# Patient Record
Sex: MALE | Race: WHITE | NOT HISPANIC OR LATINO | URBAN - METROPOLITAN AREA
[De-identification: names, ages, dates, MRNs, and addresses within clinical notes are randomized per-mention and may not be internally consistent; named-entity substitution may affect disease eponyms.]

---

## 2017-05-03 ENCOUNTER — IMPORTED ENCOUNTER (OUTPATIENT)
Dept: URBAN - METROPOLITAN AREA CLINIC 31 | Facility: CLINIC | Age: 82
End: 2017-05-03

## 2017-05-03 PROBLEM — H35.3131: Noted: 2017-05-03

## 2017-05-03 PROBLEM — H04.123: Noted: 2017-05-03

## 2017-05-03 PROBLEM — Z96.1: Noted: 2017-05-03

## 2017-05-03 PROBLEM — H43.811: Noted: 2017-05-03

## 2017-05-03 PROCEDURE — 92015 DETERMINE REFRACTIVE STATE: CPT

## 2017-05-03 PROCEDURE — 92014 COMPRE OPH EXAM EST PT 1/>: CPT

## 2017-05-03 NOTE — PATIENT DISCUSSION
1.  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 2.  Pseudophakia OU - IOLs stable. open capsules ou. Monitor. 3. PVD OD:  Looks like seahorse. Patient was cautioned to call our office immediately if they experience a substantial change in their symptoms such as an increase in floaters persistent flashes loss of visual field (may appear as a shadow or a curtain) or decrease in visual acuity as these may indicate a retinal tear or detachment. If this is a new problem patient will need to return for re-examination  as determined by the [address] [place] [address] 10. ARMD OU dry - stable. Importance of smoking cessation blood pressure control and healthy diet were emphasized. In accordance with the AREDS study a good multivitamin containing EC and Zinc were recommened to be taken daily. Patient was instructed to self monitor their monocular vision (reading/[employer]) at least weekly. Patient should immediately report any new onset of decreased vision or metamorphopsia. 5.  s/p BULB 2/16 Laquis 6. Return for an appointment in 6 months for  DFTA.

## 2018-05-04 ENCOUNTER — IMPORTED ENCOUNTER (OUTPATIENT)
Dept: URBAN - METROPOLITAN AREA CLINIC 31 | Facility: CLINIC | Age: 83
End: 2018-05-04

## 2018-05-04 PROBLEM — H04.123: Noted: 2018-05-04

## 2018-05-04 PROBLEM — Z96.1: Noted: 2018-05-04

## 2018-05-04 PROBLEM — H43.811: Noted: 2018-05-04

## 2018-05-04 PROBLEM — H35.3131: Noted: 2018-05-04

## 2018-05-04 PROCEDURE — 92014 COMPRE OPH EXAM EST PT 1/>: CPT

## 2018-05-04 PROCEDURE — 92250 FUNDUS PHOTOGRAPHY W/I&R: CPT

## 2018-05-04 PROCEDURE — 92015 DETERMINE REFRACTIVE STATE: CPT

## 2018-05-04 NOTE — PATIENT DISCUSSION
1.  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 2.  Pseudophakia OU - IOLs stable. open capsules ou. Monitor. 3. PVD OD:  Looks like seahorse. Patient was cautioned to call our office immediately if they experience a substantial change in their symptoms such as an increase in floaters persistent flashes loss of visual field4. ARMD OU dry - stable. NO smoking vits UV suns and amsler. Pt takes [id number]. Patient should immediately report any new onset of decreased vision or metamorphopsia. 5.  s/p BULB 2/16 Laquis 6. NO rx changes. 7. RTN 6 months for  DFTA/[facility].

## 2018-11-06 ENCOUNTER — IMPORTED ENCOUNTER (OUTPATIENT)
Dept: URBAN - METROPOLITAN AREA CLINIC 31 | Facility: CLINIC | Age: 83
End: 2018-11-06

## 2018-11-06 PROBLEM — Z96.1: Noted: 2018-11-06

## 2018-11-06 PROBLEM — H43.811: Noted: 2018-11-06

## 2018-11-06 PROBLEM — H04.123: Noted: 2018-11-06

## 2018-11-06 PROBLEM — H35.3131: Noted: 2018-11-06

## 2018-11-06 PROCEDURE — 99214 OFFICE O/P EST MOD 30 MIN: CPT

## 2018-11-06 NOTE — PATIENT DISCUSSION
1.  Dry Eye OU:  Continue with Artificial Tears at least 2x per day. 2.  Pseudophakia OU - IOLs stable. open capsules ou. Monitor. 3. PVD OD:  Looks like seahorse. Patient was cautioned to call our office immediately if they experience a substantial change in their symptoms such as an increase in floaters persistent flashes loss of visual field4. ARMD OU dry - stable. NO smoking vits UV suns and amsler. Pt takes [id number]. Patient should immediately report any new onset of decreased vision or metamorphopsia. 5.  s/p BULB 2/16 Laquis 6. Defer dist rx. NO rx change in near rx. 7.   RTN  4/19 CE/[facility]

## 2019-04-26 ENCOUNTER — IMPORTED ENCOUNTER (OUTPATIENT)
Dept: URBAN - METROPOLITAN AREA CLINIC 31 | Facility: CLINIC | Age: 84
End: 2019-04-26

## 2019-04-26 PROBLEM — H35.3131: Noted: 2019-04-26

## 2019-04-26 PROBLEM — Z96.1: Noted: 2019-04-26

## 2019-04-26 PROBLEM — H43.811: Noted: 2019-04-26

## 2019-04-26 PROBLEM — H04.123: Noted: 2019-04-26

## 2019-04-26 PROCEDURE — 92250 FUNDUS PHOTOGRAPHY W/I&R: CPT

## 2019-04-26 PROCEDURE — 92014 COMPRE OPH EXAM EST PT 1/>: CPT

## 2019-04-26 PROCEDURE — 92015 DETERMINE REFRACTIVE STATE: CPT

## 2019-04-26 NOTE — PATIENT DISCUSSION
1.  Dry Eye OU:  Continue Artificial Tears at least 2x per day. 2.  Pseudophakia OU - IOLs stable. open capsules ou. Monitor. 3. PVD OD:  Looks like seahorse. Patient was cautioned to call our office immediately if they experience a substantial change in their symptoms such as an increase in floaters persistent flashes loss of visual field4. ARMD OU dry - stable. NO smoking vits UV suns and amsler. Pt takes [id number]. Patient should immediately report any new onset of decreased vision or metamorphopsia. 5.  s/p BULB 2/16 Laquis 6. Defer dist rx. NO rx change in near rx. 7.   RTN  10/19  DF/OCT MAC

## 2019-10-22 ENCOUNTER — IMPORTED ENCOUNTER (OUTPATIENT)
Dept: URBAN - METROPOLITAN AREA CLINIC 31 | Facility: CLINIC | Age: 84
End: 2019-10-22

## 2019-10-22 PROBLEM — H35.3131: Noted: 2019-10-22

## 2019-10-22 PROBLEM — H04.123: Noted: 2019-10-22

## 2019-10-22 PROBLEM — Z96.1: Noted: 2019-10-22

## 2019-10-22 PROBLEM — H43.811: Noted: 2019-10-22

## 2019-10-22 PROCEDURE — 92134 CPTRZ OPH DX IMG PST SGM RTA: CPT

## 2019-10-22 PROCEDURE — 99080 SPECIAL REPORTS OR FORMS: CPT

## 2019-10-22 PROCEDURE — 99214 OFFICE O/P EST MOD 30 MIN: CPT

## 2019-10-22 NOTE — PATIENT DISCUSSION
1.  Dry Eye OU:    small amount of crusting--wash lids am and pm--Continue Artificial Tears at least 2x per day. 2.  Pseudophakia OU - IOLs stable. open capsules ou. Monitor. 3. PVD OD:  Looks like seahorse. Patient was cautioned to call our office immediately if they experience a substantial change in their symptoms such as an increase in floaters persistent flashes loss of visual field4. ARMD OU dry - stable. NO smoking vits UV suns and amsler. Pt takes [id number]. Patient should immediately report any new onset of decreased vision or metamorphopsia. OCT mac 256/292. 5.  s/p BULB 2/16 Laquis 6. Defer dist rx. NO rx change in near rx. 7.   RTN  4/20 CE/OPtos- for AMD progression

## 2020-05-05 ENCOUNTER — IMPORTED ENCOUNTER (OUTPATIENT)
Dept: URBAN - METROPOLITAN AREA CLINIC 31 | Facility: CLINIC | Age: 85
End: 2020-05-05

## 2020-05-05 PROBLEM — H01.005: Noted: 2020-05-05

## 2020-05-05 PROBLEM — H35.3131: Noted: 2020-05-05

## 2020-05-05 PROBLEM — H01.001: Noted: 2020-05-05

## 2020-05-05 PROBLEM — H43.811: Noted: 2020-05-05

## 2020-05-05 PROBLEM — H04.123: Noted: 2020-05-05

## 2020-05-05 PROBLEM — H01.004: Noted: 2020-05-05

## 2020-05-05 PROBLEM — H01.002: Noted: 2020-05-05

## 2020-05-05 PROBLEM — Z96.1: Noted: 2020-05-05

## 2020-05-05 PROCEDURE — 92014 COMPRE OPH EXAM EST PT 1/>: CPT

## 2020-05-05 PROCEDURE — 92015 DETERMINE REFRACTIVE STATE: CPT

## 2020-05-05 NOTE — PATIENT DISCUSSION
1.  Dry Eye OU:    small amount of crusting--wash lids am and pm--Continue Artificial Tears at least 2x per day. 2.  Pseudophakia OU - IOLs stable. open capsules ou. Monitor. 3. PVD OD:  Looks like seahorse. Patient was cautioned to call our office immediately if they experience a substantial change in their symptoms such as an increase in floaters persistent flashes loss of visual field4. ARMD OU dry - stable. NO smoking vits UV suns and amsler. Pt takes [id number]. Patient should immediately report any new onset of decreased vision or metamorphopsia. OCT mac 256/292. 5.  s/p BULB 2/16 Laquis 6. Defer dist rx. NO rx change in near rx. 7.   RTN  4/20 CE/OPtos- for AMD progression

## 2020-11-10 ENCOUNTER — IMPORTED ENCOUNTER (OUTPATIENT)
Dept: URBAN - METROPOLITAN AREA CLINIC 31 | Facility: CLINIC | Age: 85
End: 2020-11-10

## 2020-11-10 PROBLEM — Z96.1: Noted: 2020-11-10

## 2020-11-10 PROBLEM — H35.3131: Noted: 2020-11-10

## 2020-11-10 PROBLEM — H43.811: Noted: 2020-11-10

## 2020-11-10 PROBLEM — H04.123: Noted: 2020-11-10

## 2020-11-10 PROCEDURE — 99214 OFFICE O/P EST MOD 30 MIN: CPT

## 2020-11-10 PROCEDURE — 92134 CPTRZ OPH DX IMG PST SGM RTA: CPT

## 2020-11-10 NOTE — PATIENT DISCUSSION
1.  Dry Eye OU:    small amount of crusting--wash lids am and pm--Continue Artificial Tears at least 2x per day. 2.  Pseudophakia OU - IOLs stable. open capsules ou. Monitor. 3. PVD OD:  Looks like seahorse. Patient was cautioned to call our office immediately if they experience a substantial change in their symptoms such as an increase in floaters persistent flashes loss of visual field4. ARMD OU dry - stable. OS? OD--NO smoking vits UV suns and amsler. Pt takes [id number]. Patient should immediately report any new onset of decreased vision or metamorphopsia. OCT mac 11/10/20  257/297. 5.  s/p BULB 2/16 Laquis 6. Defer dist rx. NO rx change in near rx. 7.   RTN  4/21 CE/OPtos/OCT mac- for AMD progression OS>OD

## 2021-02-11 ENCOUNTER — IMPORTED ENCOUNTER (OUTPATIENT)
Dept: URBAN - METROPOLITAN AREA CLINIC 31 | Facility: CLINIC | Age: 86
End: 2021-02-11

## 2021-02-11 PROBLEM — Z96.1: Noted: 2021-02-11

## 2021-02-11 PROBLEM — H35.3131: Noted: 2021-02-11

## 2021-02-11 PROBLEM — H04.123: Noted: 2021-02-11

## 2021-02-11 PROBLEM — H43.811: Noted: 2021-02-11

## 2021-02-11 PROBLEM — H16.143: Noted: 2021-02-11

## 2021-02-11 PROCEDURE — 99213 OFFICE O/P EST LOW 20 MIN: CPT

## 2021-02-11 NOTE — PATIENT DISCUSSION
1.  Dry Eye OU:    small amount of crusting--wash lids am and pm--Continue Artificial Tears at least 2x per day. 2.  Pseudophakia OU - IOLs stable. open capsules ou. Monitor. 3. PVD OD:  Looks like seahorse. Patient was cautioned to call our office immediately if they experience a substantial change in their symptoms such as an increase in floaters persistent flashes loss of visual field4. ARMD OU dry - stable. OS? OD--NO smoking vits UV suns and amsler. Pt takes [id number]. Patient should immediately report any new onset of decreased vision or metamorphopsia. OCT mac 11/10/20  257/297. 5.  s/p BULB 2/16 Laquis 6. Defer dist rx. NO rx change in near rx. 7.   RTN  4/21 CE/OPtos/OCT mac- for AMD progression OS>OD

## 2021-02-11 NOTE — PATIENT DISCUSSION
1.  SPK OS>OD-- Start genteal tid and retaine rosio qhs ou--Pt has hard time with drops so rosio may be easier--Pt will call in 2 weeks if better. 2.  Dry Eye OU:    small amount of crusting--wash lids am and pm--Continue Artificial Tears at least 2x per day. 2.  Pseudophakia OU - IOLs stable. open capsules ou. Monitor. 3. PVD OD:  Looks like seahorse. Patient was cautioned to call our office immediately if they experience a substantial change in their symptoms such as an increase in floaters persistent flashes loss of visual field4. ARMD OU dry - stable. OS? OD--NO smoking vits UV suns and amsler. Pt takes [id number]. Patient should immediately report any new onset of decreased vision or metamorphopsia. OCT mac 11/10/20  257/297. 5.  s/p BULB 2/16 Laquis 6. Defer dist rx. NO rx change in near rx. 7.   RTN  4/21 CE/OPtos/OCT mac- for AMD progression OS>OD

## 2021-10-06 ENCOUNTER — IMPORTED ENCOUNTER (OUTPATIENT)
Dept: URBAN - METROPOLITAN AREA CLINIC 31 | Facility: CLINIC | Age: 86
End: 2021-10-06

## 2021-10-06 PROBLEM — H43.811: Noted: 2021-10-06

## 2021-10-06 PROBLEM — H04.123: Noted: 2021-10-06

## 2021-10-06 PROBLEM — H35.3131: Noted: 2021-10-06

## 2021-10-06 PROBLEM — Z96.1: Noted: 2021-10-06

## 2021-10-06 PROBLEM — H16.143: Noted: 2021-10-06

## 2021-10-06 PROCEDURE — 92014 COMPRE OPH EXAM EST PT 1/>: CPT

## 2021-10-06 PROCEDURE — 92134 CPTRZ OPH DX IMG PST SGM RTA: CPT

## 2021-10-06 PROCEDURE — 92015 DETERMINE REFRACTIVE STATE: CPT

## 2021-10-06 NOTE — PATIENT DISCUSSION
1.  SPK OS>OD-- Cont genteal tid and retaine rosio qhs ou--Pt has hard time with drops so rosio may be easier-- 2. Dry Eye OU:    small amount of crusting--wash lids am and pm--Continue Artificial Tears at least 2x per day. 2.  Pseudophakia OU - IOLs stable. open capsules ou. Monitor. 3. PVD OD:  Looks like seahorse. Patient was cautioned to call our office immediately if they experience a substantial change in their symptoms such as an increase in floaters persistent flashes loss of visual field4. ARMD OU dry - stable. OS? OD--NO smoking vits UV suns and amsler. Pt takes [id number]. Patient should immediately report any new onset of decreased vision or metamorphopsia. OCT mac 10/5/21  283/253 from 257/297. 5.  s/p BULB 2/16 Laquis 6. Defer dist rx. NO rx change in near rx. 7.   RTN  6 mo DF/ MAC OCT

## 2022-04-01 ASSESSMENT — TONOMETRY
OS_IOP_MMHG: 15
OD_IOP_MMHG: 14
OS_IOP_MMHG: 15
OD_IOP_MMHG: 15
OD_IOP_MMHG: 14
OS_IOP_MMHG: 14
OS_IOP_MMHG: 14
OD_IOP_MMHG: 15
OS_IOP_MMHG: 15
OS_IOP_MMHG: 15
OD_IOP_MMHG: 15
OD_IOP_MMHG: 14
OD_IOP_MMHG: 15
OS_IOP_MMHG: 12
OS_IOP_MMHG: 15
OD_IOP_MMHG: 13
OS_IOP_MMHG: 15
OD_IOP_MMHG: 15

## 2022-04-01 ASSESSMENT — VISUAL ACUITY
OS_CC: 20/25-2
OS_CC: 20/30
OS_CC: 20/30-2
OD_CC: 20/25-2
OD_CC: 20/25-2
OS_CC: 20/25-1
OD_SC: 20/40
OD_CC: 20/30+3
OS_CC: 20/40
OD_CC: 20/30-2
OS_SC: 20/25-2
OS_CC: 20/25-1
OS_CC: 20/25
OD_CC: 20/30-1
OD_CC: 20/30+2
OD_CC: 20/30-2
OD_CC: 20/25
OD_CC: 20/30-2
OS_CC: 20/25-2
OS_CC: 20/40-2